# Patient Record
Sex: MALE
[De-identification: names, ages, dates, MRNs, and addresses within clinical notes are randomized per-mention and may not be internally consistent; named-entity substitution may affect disease eponyms.]

---

## 2019-07-19 ENCOUNTER — HOSPITAL ENCOUNTER (EMERGENCY)
Dept: HOSPITAL 88 - ER | Age: 24
Discharge: HOME | End: 2019-07-19
Payer: SELF-PAY

## 2019-07-19 VITALS — HEIGHT: 69 IN | BODY MASS INDEX: 24.73 KG/M2 | WEIGHT: 167 LBS

## 2019-07-19 VITALS — DIASTOLIC BLOOD PRESSURE: 73 MMHG | SYSTOLIC BLOOD PRESSURE: 148 MMHG

## 2019-07-19 DIAGNOSIS — M54.6: ICD-10-CM
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
## 2019-07-19 DIAGNOSIS — M54.2: Primary | ICD-10-CM

## 2019-07-19 DIAGNOSIS — S16.1XXA: ICD-10-CM

## 2019-07-19 DIAGNOSIS — S23.3XXA: ICD-10-CM

## 2019-07-19 PROCEDURE — 99282 EMERGENCY DEPT VISIT SF MDM: CPT

## 2019-07-19 NOTE — XMS REPORT
Clinical Summary

                             Created on: 2019



Garret Bill

External Reference #: [id number]

: 1995

Sex: Male



Demographics







                          Address                   [address] [place] [address]  [id number]

 

                          Home Phone                [phone]

 

                          Preferred Language        English

 

                          Marital Status            Single

 

                          Jainism Affiliation     Unknown

 

                          Race                      White

 

                          Ethnic Group              /Latin





Author







                          Author                    [facility]

 

                          Organization              [facility]

 

                          Address                   Unknown

 

                          Phone                     Unavailable







Support







                Name            Relationship    Address         Phone

 

                no, contact     [employer]            Unknown         [phone]







Care Team Providers







                    Care Team Member Name    Role                Phone

 

                    Asked, No Pcp       PCP                 Unavailable







Allergies

Not on File



Medications

Not on file



Active Problems





Not on file



Encounters







                          Care Team                 Description



                     Date                Type                Specialty  

 

                                                     



                     2019          Emergency           Emergency Medicine  



after 2018



Social History







                                        Date



                 Tobacco Use     Types           Packs/Day       Years Used 

 

                                         



                                         Never Assessed    









 



                           Sex Assigned at Birth     Date Recorded

 

 



                                         Not on file 









                                        Industry



                           Job Start Date            Occupation 

 

                                        Not on file



                           Not on file               Not on file 









                                        Travel End



                           Travel History            Travel Start 

 





                                         No recent travel history available.







Last Filed Vital Signs

Not on file



Plan of Treatment





Not on file



Results

Not on fileafter 2018